# Patient Record
Sex: FEMALE | Race: WHITE | NOT HISPANIC OR LATINO | Employment: FULL TIME | URBAN - METROPOLITAN AREA
[De-identification: names, ages, dates, MRNs, and addresses within clinical notes are randomized per-mention and may not be internally consistent; named-entity substitution may affect disease eponyms.]

---

## 2017-02-07 ENCOUNTER — OFFICE VISIT (OUTPATIENT)
Dept: OBSTETRICS AND GYNECOLOGY | Facility: CLINIC | Age: 47
End: 2017-02-07

## 2017-02-07 VITALS
DIASTOLIC BLOOD PRESSURE: 70 MMHG | BODY MASS INDEX: 27.14 KG/M2 | HEIGHT: 64 IN | WEIGHT: 159 LBS | SYSTOLIC BLOOD PRESSURE: 114 MMHG

## 2017-02-07 DIAGNOSIS — Z00.00 ANNUAL PHYSICAL EXAM: Primary | ICD-10-CM

## 2017-02-07 DIAGNOSIS — N95.2 ATROPHIC VAGINITIS: ICD-10-CM

## 2017-02-07 PROCEDURE — 99396 PREV VISIT EST AGE 40-64: CPT | Performed by: OBSTETRICS & GYNECOLOGY

## 2017-02-07 RX ORDER — THYROID,PORK 15 MG
15 TABLET ORAL DAILY
COMMUNITY
Start: 2017-01-27 | End: 2019-09-17

## 2017-02-07 RX ORDER — LINACLOTIDE 145 UG/1
145 CAPSULE, GELATIN COATED ORAL DAILY
COMMUNITY
Start: 2016-12-12 | End: 2020-08-13 | Stop reason: SDUPTHER

## 2017-02-07 RX ORDER — POLYETHYLENE GLYCOL 3350 17 G/17G
1 POWDER, FOR SOLUTION ORAL
COMMUNITY
Start: 2016-12-12

## 2017-02-07 RX ORDER — THYROID 60 MG
60 TABLET ORAL DAILY
COMMUNITY
Start: 2017-01-27 | End: 2019-09-17 | Stop reason: SDUPTHER

## 2017-02-07 RX ORDER — BUPROPION HYDROCHLORIDE 150 MG/1
150 TABLET ORAL DAILY
COMMUNITY
Start: 2017-01-30 | End: 2019-09-17

## 2017-02-07 RX ORDER — SERTRALINE HYDROCHLORIDE 100 MG/1
100 TABLET, FILM COATED ORAL DAILY
COMMUNITY
Start: 2016-12-28 | End: 2019-09-17

## 2018-01-18 ENCOUNTER — TRANSCRIBE ORDERS (OUTPATIENT)
Dept: ADMINISTRATIVE | Facility: HOSPITAL | Age: 48
End: 2018-01-18

## 2018-01-18 DIAGNOSIS — Z12.31 VISIT FOR SCREENING MAMMOGRAM: Primary | ICD-10-CM

## 2018-02-19 ENCOUNTER — HOSPITAL ENCOUNTER (OUTPATIENT)
Dept: MAMMOGRAPHY | Facility: HOSPITAL | Age: 48
Discharge: HOME | End: 2018-02-19
Attending: OBSTETRICS & GYNECOLOGY | Admitting: OBSTETRICS & GYNECOLOGY

## 2018-02-19 DIAGNOSIS — Z12.31 VISIT FOR SCREENING MAMMOGRAM: ICD-10-CM

## 2018-02-19 PROCEDURE — 77067 SCR MAMMO BI INCL CAD: CPT

## 2018-02-19 PROCEDURE — 77063 BREAST TOMOSYNTHESIS BI: CPT

## 2018-02-19 PROCEDURE — 77067 SCR MAMMO BI INCL CAD: CPT | Performed by: RADIOLOGY

## 2018-02-19 PROCEDURE — 77063 BREAST TOMOSYNTHESIS BI: CPT | Performed by: RADIOLOGY

## 2018-11-17 NOTE — PROGRESS NOTES
"Subjective   Chief Complaint   Patient presents with   • Gynecologic Exam     Sarath Huff is a 46 y.o. year old  menopausal female presenting to be seen for her annual exam.  There has not been vaginal bleeding in the last 12 months.  Hot flashes and night sweats are not a significant problem.  She does not feel like she is emptying her bladder completely. She is not leaking at all since her TVT in . No nocturia and she sleeps well. Bowels are okay on 2 medicines. She still has pain with sex at entry, lubricants help. I discussed using vaginal estrogens.     SEXUAL Hx:  She is sexually active.  Vaginal dryness is not a problem.    HEALTH Hx:  She exercises regularly: no.  She wears her seat belt:yes.  She has concerns about domestic violence: no.  She has noticed changes in height: no.              Calcium intake is adequate    The following portions of the patient's history were reviewed and updated as appropriate:problem list, current medications, allergies, past family history, past medical history, past social history and past surgical history.    Smoking status: Never Smoker                                                                 Smokeless status: Not on file                       Review of Systems      Objective   Visit Vitals   • /70   • Ht 64.25\" (163.2 cm)   • Wt 159 lb (72.1 kg)   • LMP  (LMP Unknown)   • BMI 27.08 kg/m2       General:  well developed; well nourished  no acute distress   Skin:  No suspicious lesions seen   Thyroid: not examined   Breasts:  Examined in supine position  Symmetric without masses or skin dimpling  Nipples normal without inversion, lesions or discharge  There are no palpable axillary nodes   Abdomen: soft, non-tender; no masses  no umbilical or inginual hernias are present  no hepato-splenomegaly   Pelvis: Clinical staff was present for exam  External genitalia:  normal appearance of the external genitalia including Bartholin's and [place]'s " glands.  :  urethral meatus normal; urethral hypermobility is absent.  Vaginal:  atrophic mucosal changes are present;  Cervix:  normal appearance.  Uterus:  normal size, shape and consistency.  Adnexa:  non palpable bilaterally.  Rectal:  anus visually normal appearing.        Assessment   1. Normal post menopausal exam except for incomplete bladder emptying s/p TVT - no leakage and she is happy with that.  2. Dyspareunia      Plan   1. Trial of estrogen cream 2 x weekly    New Medications Ordered This Visit   Medications   • buPROPion XL (WELLBUTRIN XL) 150 MG 24 hr tablet     Sig: Take 150 mg by mouth Daily.   • LINZESS 145 MCG capsule     Sig: Take 145 mcg by mouth Daily.   • polyethylene glycol (MIRALAX) powder     Sig: Take 1 g by mouth Daily.   • sertraline (ZOLOFT) 100 MG tablet     Sig: Take 100 mg by mouth Daily.   • ARMOUR THYROID 60 MG tablet     Sig: Take 60 mg by mouth Daily.   • ARMOUR THYROID 15 MG tablet     Sig: Take 15 mg by mouth Daily.   • conjugated estrogens (PREMARIN) 0.625 MG/GM vaginal cream     Sig: Insert  into the vagina 2 (Two) Times a Week.     Dispense:  30 g     Refill:  3          This note was electronically signed.    Rui Almonte M.D.  February 7, 2017   Never

## 2019-01-28 ENCOUNTER — TRANSCRIBE ORDERS (OUTPATIENT)
Dept: OBSTETRICS AND GYNECOLOGY | Facility: CLINIC | Age: 49
End: 2019-01-28

## 2019-01-28 DIAGNOSIS — Z12.31 VISIT FOR SCREENING MAMMOGRAM: Primary | ICD-10-CM

## 2019-03-08 ENCOUNTER — HOSPITAL ENCOUNTER (OUTPATIENT)
Dept: MAMMOGRAPHY | Facility: HOSPITAL | Age: 49
End: 2019-03-08
Attending: OBSTETRICS & GYNECOLOGY

## 2019-03-08 ENCOUNTER — HOSPITAL ENCOUNTER (OUTPATIENT)
Dept: MAMMOGRAPHY | Facility: HOSPITAL | Age: 49
Discharge: HOME | End: 2019-03-08
Attending: OBSTETRICS & GYNECOLOGY | Admitting: OBSTETRICS & GYNECOLOGY

## 2019-03-08 DIAGNOSIS — Z12.31 VISIT FOR SCREENING MAMMOGRAM: ICD-10-CM

## 2019-03-08 PROCEDURE — 77063 BREAST TOMOSYNTHESIS BI: CPT

## 2019-03-08 PROCEDURE — 77067 SCR MAMMO BI INCL CAD: CPT

## 2019-03-08 PROCEDURE — 77067 SCR MAMMO BI INCL CAD: CPT | Performed by: RADIOLOGY

## 2019-03-08 PROCEDURE — 77063 BREAST TOMOSYNTHESIS BI: CPT | Performed by: RADIOLOGY

## 2019-03-08 NOTE — PROGRESS NOTES
She has not been seen in over a year and does not have an upcoming appointment scheduled.  She needs to make an appointment for annual examination for me to sign off on mammogram orders.  Thank you

## 2019-09-17 ENCOUNTER — OFFICE VISIT (OUTPATIENT)
Dept: OBSTETRICS AND GYNECOLOGY | Facility: CLINIC | Age: 49
End: 2019-09-17

## 2019-09-17 VITALS
DIASTOLIC BLOOD PRESSURE: 70 MMHG | HEIGHT: 65 IN | WEIGHT: 205 LBS | BODY MASS INDEX: 34.16 KG/M2 | SYSTOLIC BLOOD PRESSURE: 128 MMHG

## 2019-09-17 DIAGNOSIS — N94.10 DYSPAREUNIA, FEMALE: ICD-10-CM

## 2019-09-17 DIAGNOSIS — N95.2 VAGINAL ATROPHY: ICD-10-CM

## 2019-09-17 DIAGNOSIS — Z01.419 ENCOUNTER FOR WELL WOMAN EXAM WITH ROUTINE GYNECOLOGICAL EXAM: Primary | ICD-10-CM

## 2019-09-17 DIAGNOSIS — K59.01 SLOW TRANSIT CONSTIPATION: ICD-10-CM

## 2019-09-17 PROBLEM — E03.9 HYPOTHYROIDISM (ACQUIRED): Status: ACTIVE | Noted: 2019-09-17

## 2019-09-17 PROBLEM — E28.319 EARLY MENOPAUSE: Status: ACTIVE | Noted: 2019-09-17

## 2019-09-17 PROCEDURE — 99396 PREV VISIT EST AGE 40-64: CPT | Performed by: OBSTETRICS & GYNECOLOGY

## 2019-09-17 RX ORDER — FLUTICASONE PROPIONATE 44 UG/1
AEROSOL, METERED RESPIRATORY (INHALATION)
COMMUNITY
Start: 2015-11-13

## 2019-09-17 RX ORDER — ALBUTEROL SULFATE 90 UG/1
AEROSOL, METERED RESPIRATORY (INHALATION)
COMMUNITY
End: 2020-10-13

## 2019-09-17 RX ORDER — THYROID,PORK 90 MG
TABLET ORAL
COMMUNITY
Start: 2019-07-18 | End: 2020-10-13

## 2019-09-17 RX ORDER — DOXYCYCLINE HYCLATE 20 MG
TABLET ORAL
COMMUNITY
Start: 2019-09-12 | End: 2020-10-13

## 2019-09-17 RX ORDER — CETIRIZINE HYDROCHLORIDE 10 MG/1
TABLET ORAL
COMMUNITY

## 2019-09-17 NOTE — PROGRESS NOTES
Subjective   Chief Complaint   Patient presents with   • Annual Exam     Sarath Huff is a 48 y.o. year old  presenting to be seen for her annual exam.    Current birth control method: Vasectomy.    No LMP recorded. Patient is postmenopausal.    She is sexually active.   Condoms are not typically used.    [place] is painful or she is having problems :yes she reports that she uses over-the-counter lubricants and still has his pain at entry posteriorly.  She has concerns about domestic violence: no.    Cycle Frequency: absent for 12 years age 37 or so - tell daughters regarding future pregnancies                         She exercises regularly: no.past 6 months ; up 20 lbs   Self breast awareness:yes    Calcium intake: is not adequate.0  Caffeine intake: no or mild caffeine use  Social History    Tobacco Use      Smoking status: Never Smoker      Smokeless tobacco: Never Used    Social History     Substance and Sexual Activity   Alcohol Use No        The following portions of the patient's history were reviewed and updated as is  appropriate:She  has a past medical history of Elevated cholesterol and Hypothyroid.  She does not have any pertinent problems on file.  She  has a past surgical history that includes Tonsillectomy (); Mid-Urethral Sling (2012); and Colonoscopy ().  Her family history includes Breast cancer (age of onset: 60) in her maternal aunt; Heart disease in her other; Hyperlipidemia in her other.  She  reports that she has never smoked. She has never used smokeless tobacco. She reports that she does not drink alcohol or use drugs.  She is allergic to venlafaxine..    Current Outpatient Medications:   •  albuterol sulfate HFA (PROAIR HFA) 108 (90 Base) MCG/ACT inhaler, ProAir HFA 90 mcg/actuation aerosol inhaler  Every 6hours prn cough/wheezing, Disp: , Rfl:   •  ARMOUR THYROID 90 MG tablet, , Disp: , Rfl:   •  cetirizine (ZYRTEC ALLERGY) 10 MG tablet, Zyrtec 10 mg tablet   "Bedtime, Disp: , Rfl:   •  doxycycline (PERIOSTAT) 20 MG tablet, , Disp: , Rfl:   •  fluticasone (FLOVENT HFA) 44 MCG/ACT inhaler, Flovent HFA 44 mcg/actuation aerosol inhaler  Two times a day, Disp: , Rfl:   •  LINZESS 145 MCG capsule, Take 145 mcg by mouth Daily., Disp: , Rfl:   •  polyethylene glycol (MIRALAX) powder, Take 1 g by mouth 3 (Three) Times a Week if Needed., Disp: , Rfl:   •  conjugated estrogens (PREMARIN) 0.625 MG/GM vaginal cream, Use 0.5  grams intravaginally 2 times weekly, Disp: 1 each, Rfl: 4    Review of systems  Constitutional    POS weight gain                            NEG anorexia, malaise or night sweats  Breast                POS nothing reported                            NEG persistent breast lump, skin dimpling, breast tenderness or nipple discharge  GI                      POS constipation (chronic) and working with Linzess                            NEG bloating, change in bowel habits, melena or reflux symptoms                       POS hesitancy and takes awhile to empty s/p TVT no leakage                            NEG dysuria, frequency or hematuria       Objective   /70   Ht 165.1 cm (65\")   Wt 93 kg (205 lb)   Breastfeeding? No   BMI 34.11 kg/m²       General:  well developed; well nourished  no acute distress  appears stated age   Skin:  No suspicious lesions seen   Thyroid:    Breasts:  Examined in supine position  Symmetric without masses or skin dimpling  Nipples normal without inversion, lesions or discharge  Fibrocystic changes are present both breasts without a discrete mass   Abdomen: soft, non-tender; no masses  no umbilical or inguinal hernias are present  no hepato-splenomegaly   Pelvis: Clinical staff was present for exam  External genitalia:  normal appearance of the external genitalia including Bartholin's and [place]'s glands.  :  urethral meatus normal; urethral hypermobility is absent.  Vaginal:  atrophic mucosal changes are present; pH = " 7  Cervix:  normal appearance. Pap obtained  Uterus:  normal size, shape and consistency.  Adnexa:  non palpable bilaterally.       Lab Review   Pap test    Imaging  Mammogram report       Assessment     1. Early menopause approximately 12 years ago.  2. Vaginal atrophy with elevated pH visually atrophic.  Dyspareunia likely due to same options discussed.  We will give samples of Osphena and Premarin cream and see which she would like to select.  I have reinforced the safety of this medication which we discussed about 2 and half years ago when she declined to use it.  3. She is had a colonoscopy about 5 or 6 years ago for constipation  4. Pap co-testing done today  5. Mammograms up-to-date             Plan     1. Annual examination or sooner as needed  2. 1000 mg calcium in divided doses ideally in diet; regular exercise health benefits mentioned.  3. Self breast awareness if appropriate  4.    Encourage trial of either Premarin cream or Osphena depending on cost and convenience.  Samples of both given.            New Medications Ordered This Visit   Medications   • conjugated estrogens (PREMARIN) 0.625 MG/GM vaginal cream     Sig: Use 0.5  grams intravaginally 2 times weekly     Dispense:  1 each     Refill:  4     No orders of the defined types were placed in this encounter.          This note was electronically signed.    Rui Almonte MD  9/17/2019

## 2019-10-29 ENCOUNTER — TELEPHONE (OUTPATIENT)
Dept: OBSTETRICS AND GYNECOLOGY | Facility: CLINIC | Age: 49
End: 2019-10-29

## 2020-02-19 ENCOUNTER — TRANSCRIBE ORDERS (OUTPATIENT)
Dept: ADMINISTRATIVE | Facility: HOSPITAL | Age: 50
End: 2020-02-19

## 2020-02-19 DIAGNOSIS — Z12.31 VISIT FOR SCREENING MAMMOGRAM: Primary | ICD-10-CM

## 2020-04-17 ENCOUNTER — APPOINTMENT (OUTPATIENT)
Dept: MAMMOGRAPHY | Facility: HOSPITAL | Age: 50
End: 2020-04-17

## 2020-06-05 ENCOUNTER — HOSPITAL ENCOUNTER (OUTPATIENT)
Dept: MAMMOGRAPHY | Facility: HOSPITAL | Age: 50
Discharge: HOME | End: 2020-06-05
Admitting: OBSTETRICS & GYNECOLOGY

## 2020-06-05 DIAGNOSIS — Z12.31 VISIT FOR SCREENING MAMMOGRAM: ICD-10-CM

## 2020-06-05 PROCEDURE — 77063 BREAST TOMOSYNTHESIS BI: CPT | Performed by: RADIOLOGY

## 2020-06-05 PROCEDURE — 77067 SCR MAMMO BI INCL CAD: CPT | Performed by: RADIOLOGY

## 2020-06-05 PROCEDURE — 77067 SCR MAMMO BI INCL CAD: CPT

## 2020-06-05 PROCEDURE — 77063 BREAST TOMOSYNTHESIS BI: CPT

## 2020-08-13 ENCOUNTER — TELEPHONE (OUTPATIENT)
Dept: OBSTETRICS AND GYNECOLOGY | Facility: CLINIC | Age: 50
End: 2020-08-13

## 2020-08-13 RX ORDER — LINACLOTIDE 145 UG/1
145 CAPSULE, GELATIN COATED ORAL DAILY
Qty: 30 CAPSULE | Refills: 1 | Status: SHIPPED | OUTPATIENT
Start: 2020-08-13

## 2020-10-13 ENCOUNTER — OFFICE VISIT (OUTPATIENT)
Dept: OBSTETRICS AND GYNECOLOGY | Facility: CLINIC | Age: 50
End: 2020-10-13

## 2020-10-13 VITALS
BODY MASS INDEX: 32.32 KG/M2 | SYSTOLIC BLOOD PRESSURE: 120 MMHG | WEIGHT: 194 LBS | HEIGHT: 65 IN | DIASTOLIC BLOOD PRESSURE: 80 MMHG

## 2020-10-13 DIAGNOSIS — Z01.411 ENCOUNTER FOR GYNECOLOGICAL EXAMINATION WITH ABNORMAL FINDING: Primary | ICD-10-CM

## 2020-10-13 DIAGNOSIS — N94.10 DYSPAREUNIA, FEMALE: ICD-10-CM

## 2020-10-13 DIAGNOSIS — N95.2 VAGINAL ATROPHY: ICD-10-CM

## 2020-10-13 PROCEDURE — 99396 PREV VISIT EST AGE 40-64: CPT | Performed by: OBSTETRICS & GYNECOLOGY

## 2020-10-13 RX ORDER — LEVOTHYROXINE SODIUM 112 UG/1
CAPSULE ORAL
COMMUNITY

## 2020-10-13 NOTE — PROGRESS NOTES
"Subjective   Chief Complaint   Patient presents with   • Annual Exam     Sarath Huff is a 50 y.o. year old  menopausal female presenting to be seen for her annual exam.  There has not been vaginal bleeding in the last 12 months.  Hot flashes and night sweats are not a significant problem.    SEXUAL Hx:  She is not sexually active.  Vaginal dryness is a problem.  [place] is painful:no if uses lubricant;  this is less of an issue than it had been.  She has concerns about domestic violence: no    HEALTH Hx:  She exercises regularly: yes.  She wears her seat belt:yes.  Self breast awareness: yes  She has noticed changes in height: yes maybe WW height is 5'8\" ? No change since  office note              Calcium intake is not adequate 1 daily              Caffeine intake: no or mild caffeine use    The following portions of the patient's history were reviewed and updated as appropriate:problem list, current medications, allergies, past family history, past medical history, past social history and past surgical history.      Current Outpatient Medications:   •  cetirizine (ZYRTEC ALLERGY) 10 MG tablet, Zyrtec 10 mg tablet  Bedtime, Disp: , Rfl:   •  fluticasone (FLOVENT HFA) 44 MCG/ACT inhaler, Flovent HFA 44 mcg/actuation aerosol inhaler  Two times a day, Disp: , Rfl:   •  levothyroxine sodium (Tirosint) 112 MCG capsule, Tirosint 112 mcg capsule  Take 1 capsule every day by oral route in the morning for 30 days., Disp: , Rfl:   •  LINZESS 145 MCG capsule capsule, Take 1 capsule by mouth Daily., Disp: 30 capsule, Rfl: 1  •  polyethylene glycol (MIRALAX) powder, Take 1 g by mouth 3 (Three) Times a Week if Needed., Disp: , Rfl:     Social History    Tobacco Use      Smoking status: Never Smoker      Smokeless tobacco: Never Used    Social History     Substance and Sexual Activity   Alcohol Use No       Review of systems  Constitutional   POS nothing reported                           NEG chills, fatigue, " "night sweats, sweats and weight gain  Breast               POS nothing reported                           NEG persistent breast lump, skin dimpling, breast tenderness or nipple discharge  GI                     POS constipation (chronic)                           NEG bloating, change in bowel habits, melena or reflux symptoms                      POS nothing reported                           NEG dysuria, frequency or hematuria           Objective   /80   Ht 164.5 cm (64.75\")   Wt 88 kg (194 lb)   Breastfeeding No   BMI 32.53 kg/m²      General:  well developed; well nourished  no acute distress  appears stated age   Skin:  No suspicious lesions seen   Thyroid: not examined   Breasts:  Examined in supine position  Symmetric without masses or skin dimpling  Nipples normal without inversion, lesions or discharge  Fibrocystic changes are present both breasts without a discrete mass   Abdomen: soft, non-tender; no masses  no umbilical or inguinal hernias are present  no hepato-splenomegaly   Pelvis: Clinical staff was present for exam  External genitalia:  normal appearance of the external genitalia including Bartholin's and [place]'s glands.  :  urethral meatus normal;  Vaginal:  atrophic mucosal changes are present;  Uterus:  normal size, shape and consistency.  Adnexa:  non palpable bilaterally.  Rectal:  digital rectal exam not performed; anus visually normal appearing.       Lab Review   Pap test  Imaging review  Mammogram report       Assessment     1. Normal postmenopausal examination history of early menopause.  Asymptomatic at this time.  No longer sexually active had to use lubricants.  Did not remember estrogen creams in the past.  2. Up-to-date with recent mammogram  3. Colonoscopy discussed  4. Labs via primary care       Plan     1. Annual or sooner as needed Pap cotesting normal 2019  2. Calcium discussed  1200 mg daily in divided doses ideally in diet particular with history of " constipation.  3. Regular weight bearing exercise  4. Breast self awareness and mammograms discussed  5.     No orders of the defined types were placed in this encounter.     No orders of the defined types were placed in this encounter.             This note was electronically signed.    Rui Almonte M.D.  October 13, 2020

## 2021-08-10 ENCOUNTER — TRANSCRIBE ORDERS (OUTPATIENT)
Dept: ADMINISTRATIVE | Facility: HOSPITAL | Age: 51
End: 2021-08-10

## 2021-08-10 ENCOUNTER — HOSPITAL ENCOUNTER (OUTPATIENT)
Dept: MAMMOGRAPHY | Facility: HOSPITAL | Age: 51
Discharge: HOME | End: 2021-08-10
Admitting: INTERNAL MEDICINE

## 2021-08-10 DIAGNOSIS — Z12.31 VISIT FOR SCREENING MAMMOGRAM: Primary | ICD-10-CM

## 2021-08-10 DIAGNOSIS — Z12.31 VISIT FOR SCREENING MAMMOGRAM: ICD-10-CM

## 2021-08-10 PROCEDURE — 77063 BREAST TOMOSYNTHESIS BI: CPT | Performed by: RADIOLOGY

## 2021-08-10 PROCEDURE — 77067 SCR MAMMO BI INCL CAD: CPT

## 2021-08-10 PROCEDURE — 77067 SCR MAMMO BI INCL CAD: CPT | Performed by: RADIOLOGY

## 2021-08-10 PROCEDURE — 77063 BREAST TOMOSYNTHESIS BI: CPT

## 2022-08-26 ENCOUNTER — TRANSCRIBE ORDERS (OUTPATIENT)
Dept: ADMINISTRATIVE | Facility: HOSPITAL | Age: 52
End: 2022-08-26

## 2022-08-26 ENCOUNTER — HOSPITAL ENCOUNTER (OUTPATIENT)
Dept: MAMMOGRAPHY | Facility: HOSPITAL | Age: 52
Discharge: HOME | End: 2022-08-26
Admitting: INTERNAL MEDICINE

## 2022-08-26 DIAGNOSIS — Z12.31 VISIT FOR SCREENING MAMMOGRAM: Primary | ICD-10-CM

## 2022-08-26 DIAGNOSIS — Z12.31 VISIT FOR SCREENING MAMMOGRAM: ICD-10-CM

## 2022-08-26 PROCEDURE — 77063 BREAST TOMOSYNTHESIS BI: CPT | Performed by: RADIOLOGY

## 2022-08-26 PROCEDURE — 77067 SCR MAMMO BI INCL CAD: CPT

## 2022-08-26 PROCEDURE — 77067 SCR MAMMO BI INCL CAD: CPT | Performed by: RADIOLOGY

## 2022-08-26 PROCEDURE — 77063 BREAST TOMOSYNTHESIS BI: CPT

## 2023-09-19 ENCOUNTER — TRANSCRIBE ORDERS (OUTPATIENT)
Dept: ADMINISTRATIVE | Facility: HOSPITAL | Age: 53
End: 2023-09-19
Payer: COMMERCIAL

## 2023-09-19 DIAGNOSIS — Z12.31 VISIT FOR SCREENING MAMMOGRAM: Primary | ICD-10-CM

## 2023-11-01 ENCOUNTER — HOSPITAL ENCOUNTER (OUTPATIENT)
Dept: MAMMOGRAPHY | Facility: HOSPITAL | Age: 53
Discharge: HOME | End: 2023-11-01
Admitting: INTERNAL MEDICINE
Payer: COMMERCIAL

## 2023-11-01 DIAGNOSIS — Z12.31 VISIT FOR SCREENING MAMMOGRAM: ICD-10-CM

## 2023-11-01 PROCEDURE — 77067 SCR MAMMO BI INCL CAD: CPT

## 2023-11-01 PROCEDURE — 77063 BREAST TOMOSYNTHESIS BI: CPT

## 2024-10-10 ENCOUNTER — TRANSCRIBE ORDERS (OUTPATIENT)
Dept: ADMINISTRATIVE | Facility: HOSPITAL | Age: 54
End: 2024-10-10
Payer: COMMERCIAL

## 2024-10-10 DIAGNOSIS — Z12.31 VISIT FOR SCREENING MAMMOGRAM: Primary | ICD-10-CM

## 2024-10-29 LAB
NCCN CRITERIA FLAG: ABNORMAL
TYRER CUZICK SCORE: 13.9

## 2024-11-05 ENCOUNTER — HOSPITAL ENCOUNTER (OUTPATIENT)
Dept: MAMMOGRAPHY | Facility: HOSPITAL | Age: 54
Discharge: HOME | End: 2024-11-05
Admitting: NURSE PRACTITIONER
Payer: COMMERCIAL

## 2024-11-05 DIAGNOSIS — Z12.31 VISIT FOR SCREENING MAMMOGRAM: ICD-10-CM

## 2024-11-05 PROCEDURE — 77067 SCR MAMMO BI INCL CAD: CPT

## 2024-11-05 PROCEDURE — 77063 BREAST TOMOSYNTHESIS BI: CPT
